# Patient Record
Sex: FEMALE | Race: WHITE | Employment: UNEMPLOYED | URBAN - METROPOLITAN AREA
[De-identification: names, ages, dates, MRNs, and addresses within clinical notes are randomized per-mention and may not be internally consistent; named-entity substitution may affect disease eponyms.]

---
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
## 2023-08-07 ENCOUNTER — APPOINTMENT (OUTPATIENT)
Dept: LAB | Facility: LAB | Age: 23
End: 2023-08-07
Payer: COMMERCIAL

## 2023-11-04 ENCOUNTER — HOSPITAL ENCOUNTER (OUTPATIENT)
Dept: RADIOLOGY | Facility: EXTERNAL LOCATION | Age: 23
Discharge: HOME | End: 2023-11-04
Payer: COMMERCIAL

## 2023-11-04 DIAGNOSIS — S93.402A SPRAIN OF LEFT ANKLE, INITIAL ENCOUNTER: ICD-10-CM

## 2023-11-08 RX ORDER — CEFUROXIME AXETIL 500 MG/1
TABLET ORAL
COMMUNITY
Start: 2023-04-07

## 2023-11-08 RX ORDER — ALBUTEROL SULFATE 90 UG/1
AEROSOL, METERED RESPIRATORY (INHALATION)
COMMUNITY

## 2023-11-08 RX ORDER — SULFACETAMIDE SODIUM 100 MG/ML
LOTION TOPICAL
COMMUNITY
Start: 2023-02-09

## 2023-11-08 RX ORDER — SPIRONOLACTONE 100 MG/1
TABLET, FILM COATED ORAL
COMMUNITY
Start: 2020-12-17

## 2023-11-08 RX ORDER — SPIRONOLACTONE 50 MG/1
TABLET, FILM COATED ORAL
COMMUNITY
Start: 2023-07-12

## 2023-11-08 RX ORDER — TAZAROTENE 0.5 MG/G
GEL TOPICAL
COMMUNITY
Start: 2019-02-18

## 2023-11-09 ENCOUNTER — OFFICE VISIT (OUTPATIENT)
Dept: ORTHOPEDIC SURGERY | Facility: CLINIC | Age: 23
End: 2023-11-09
Payer: COMMERCIAL

## 2023-11-09 DIAGNOSIS — S93.492A SPRAIN OF ANTERIOR TALOFIBULAR LIGAMENT OF LEFT ANKLE, INITIAL ENCOUNTER: Primary | ICD-10-CM

## 2023-11-09 PROCEDURE — 99203 OFFICE O/P NEW LOW 30 MIN: CPT | Performed by: ORTHOPAEDIC SURGERY

## 2023-11-09 PROCEDURE — 99213 OFFICE O/P EST LOW 20 MIN: CPT | Performed by: ORTHOPAEDIC SURGERY

## 2023-11-09 NOTE — PROGRESS NOTES
This 23-year-old woman was running on October 27, 2023 when she tripped and twisted her left ankle.  Patient went to an urgent care and was fitted for a lacer ankle brace.  She has been wearing this brace.  She was referred for definitive treatment.    Patient notes the pain in her ankle is lateral and associated with ecchymosis and bruising.  She denies any previous injuries to the ankle or any neurologic symptoms in the lower extremity.    Review of systems:  A complete review of the patient's past medical history and review of 30 systems and all medications and allergies was performed. Please see adult medical record for details.    A Family history for genetic or familial inheritance issues and Social history including smoking history, alcohol consumption and exercise activities was also reviewed and significant findings noted in the patients history of present illness.    Physical Exam:  The patient is well-nourished and well-developed and in no acute distress. The patient displayed normal mood and affect. The patient's pupils were equal, round sclerae are white. Patient's respirations appear to be regular and unlabored.    Left ankle revealed swelling, bruising and tenderness over the lateral aspect of the ankle.  Point tenderness was seen at the anterior talofibular ligament.  There was no gross instability of the ankle.  There were no other skin abnormalities and no lymphangitis.  No obvious deformity of the ankle or foot.  Ankle motion was difficult to examine subtalar, midtarsal motion appeared to be full.  The Tyson test was negative for Achilles tendon rupture. The neurological exam including motor and sensory exam was performed and noted to be intact. The vascular examination including palpation of pulses and capillary refill of the foot was performed and determined to be intact.    Imaging:  I personally reviewed and measured the radiographs including AP and lateral views of the extremity and they  revealed soft tissue swelling but no acute fracture or dislocation or other significant findings.    Impression & Plan:  It is my impression this patient has sustained a grade 2 sprain of the anterior talofibular ligament.    The patient should continue wearing her ankle brace at all times weightbearing over a sock.  She should not do any running or jumping.  I would like to see the patient back in 1 month for reexam.      Note dictated with [employer] software.  Completed without full type editing and sent to avoid delay.

## 2023-12-14 ENCOUNTER — OFFICE VISIT (OUTPATIENT)
Dept: ORTHOPEDIC SURGERY | Facility: CLINIC | Age: 23
End: 2023-12-14
Payer: COMMERCIAL

## 2023-12-14 DIAGNOSIS — S93.492S SPRAIN OF ANTERIOR TALOFIBULAR LIGAMENT OF LEFT ANKLE, SEQUELA: ICD-10-CM

## 2023-12-14 PROCEDURE — 99213 OFFICE O/P EST LOW 20 MIN: CPT | Performed by: ORTHOPAEDIC SURGERY

## 2023-12-14 NOTE — PROGRESS NOTES
This 23-year-old woman returns today approximately 6 weeks status post grade 2 sprain of the left anterior talofibular ligament.  The patient is without complaints at this time.    Review of systems:  A complete review of the patient's past medical history and review of 30 systems and all medications and allergies was performed. Please see adult medical record for details.    A Family history for genetic or familial inheritance issues and Social history including smoking history, alcohol consumption and exercise activities was also reviewed and significant findings noted in the patients history of present illness.    Physical Exam:  The patient is well-nourished and well-developed and in no acute distress. The patient displayed normal mood and affect.  Patient's respirations appear to be regular and unlabored.  Examination of the left ankle reveals no swelling and minimal tenderness at the anterior talofibular ligament.  Ankle subtalar midtarsal motion is full.  There is slight weakness of eversion.  There is no gross instability of the ankle.  The calf is soft and nontender and without swelling.  The the remainder of the neurovascular exam is intact.  Good alignment is noted.    Impression & Plan:   It is my impression is patient has a resolving ankle sprain.  She should continue wearing her Aircast stirrup until her physical therapist feels she is reach full strength.  I prescribed physical therapy for strengthening and proprioceptive exercises.  She should not return to running or jumping sports until she is reach full strength.  I would be delighted to see the patient back the future should  they  have further problems.    Note dictated with "[employer] software.  Completed without full type editing and sent to avoid delay.

## 2023-12-14 NOTE — LETTER
December 14, 2023       No Recipients    Patient: Irene Ritter   YOB: 2000   Date of Visit: 12/14/2023       Dear Dr. Hong Recipients:    Thank you for referring Irene Ritter to me for evaluation. Below are my notes for this consultation.  If you have questions, please do not hesitate to call me. I look forward to following your patient along with you.       Sincerely,     Scott Colbert MD      CC:   No Recipients  ______________________________________________________________________________________    This 23-year-old woman returns today approximately 6 weeks status post grade 2 sprain of the left anterior talofibular ligament.  The patient is without complaints at this time.    Review of systems:  A complete review of the patient's past medical history and review of 30 systems and all medications and allergies was performed. Please see adult medical record for details.    A Family history for genetic or familial inheritance issues and Social history including smoking history, alcohol consumption and exercise activities was also reviewed and significant findings noted in the patients history of present illness.    Physical Exam:  The patient is well-nourished and well-developed and in no acute distress. The patient displayed normal mood and affect.  Patient's respirations appear to be regular and unlabored.  Examination of the left ankle reveals no swelling and minimal tenderness at the anterior talofibular ligament.  Ankle subtalar midtarsal motion is full.  There is slight weakness of eversion.  There is no gross instability of the ankle.  The calf is soft and nontender and without swelling.  The the remainder of the neurovascular exam is intact.  Good alignment is noted.    Impression & Plan:   It is my impression is patient has a resolving ankle sprain.  She should continue wearing her Aircast stirrup until her physical therapist feels she is reach full strength.  I prescribed  physical therapy for strengthening and proprioceptive exercises.  She should not return to running or jumping sports until she is reach full strength.  I would be delighted to see the patient back the future should  they  have further problems.    Note dictated with "[employer] software.  Completed without full type editing and sent to avoid delay.

## 2024-01-02 ENCOUNTER — APPOINTMENT (OUTPATIENT)
Dept: PHYSICAL THERAPY | Facility: HOSPITAL | Age: 24
End: 2024-01-02
Payer: COMMERCIAL

## 2024-01-08 ENCOUNTER — EVALUATION (OUTPATIENT)
Dept: PHYSICAL THERAPY | Facility: CLINIC | Age: 24
End: 2024-01-08
Payer: COMMERCIAL

## 2024-01-08 DIAGNOSIS — M25.572 LEFT ANKLE PAIN, UNSPECIFIED CHRONICITY: Primary | ICD-10-CM

## 2024-01-08 PROCEDURE — 97110 THERAPEUTIC EXERCISES: CPT | Mod: GP

## 2024-01-08 PROCEDURE — 97161 PT EVAL LOW COMPLEX 20 MIN: CPT | Mod: GP

## 2024-01-08 ASSESSMENT — ENCOUNTER SYMPTOMS
OCCASIONAL FEELINGS OF UNSTEADINESS: 0
DEPRESSION: 0
LOSS OF SENSATION IN FEET: 0

## 2024-01-08 NOTE — PROGRESS NOTES
Physical Therapy    Physical Therapy Evaluation    Patient Name: Irene Ritter  MRN: [id number]  Today's Date: 1/8/2024  Time Calculation  Start Time: 0945  Stop Time: 1030  Time Calculation (min): 45 min      Plan  Treatment/Interventions: Cryotherapy, Education/ Instruction, Hot pack, Gait training, Manual therapy, Neuromuscular re-education, Therapeutic exercises  PT Plan: Skilled PT  PT Frequency: 1 time per week  Duration: 6-8 weeks  Onset Date: 10/27/23  Certification Period Start Date: 01/08/24  Certification Period End Date: 04/07/24  Number of Treatments Authorized: [id number]  Rehab Potential: Excellent  Plan of Care Agreement: Patient    Current Problem  1. Left ankle pain, unspecified chronicity  Referral to Physical Therapy    Follow Up In Physical Therapy          Subjective   General:  General  Reason for Referral: L ankle sprain  Referred By: Scott Colbert  Preferred Learning Style: verbal, visual, written  Precautions:  Precautions  STEADI Fall Risk Score (The score of 4 or more indicates an increased risk of falling): 0  Precautions Comment: None      Goals:  Active       PT Problem       Patient will be independent with home exercise program for home maintenance.        Start:  01/08/24    Expected End:  01/22/24            Pt will demonstrate normal ROM in L ankle compared to R and improved talocrural/subtalar motion to facilitate normal squat mechanics.        Start:  01/08/24    Expected End:  01/22/24            Pt will report 0-1/10 pain with all activity and exercise, to facilitate gradual increase in running mileage.        Start:  01/08/24    Expected End:  02/22/24            Pt will increase L SLS to 1 minute to indicate improved balance/proprioception.        Start:  01/08/24    Expected End:  02/22/24                           Subjective:    Chief complaints: L ankle sprain  Onset/Surgery Date: 10/27   Mechanism of Injury: Running, possibly inversion mechanism  Previous History: No    Pain:  "0/10      Location: L lateral ankle    Type: Discomfort    Aggravators: Sitting with feet crossed, squatting    Alleviators: Brace, icing, ibuprofen     Function:    Currently going to the gym, ran on treadmill for 20 min last week    Prior Level: 12 miles per week    Current limitations: Running normal amount, squatting, sitting in certain positions    Condition: Improving      Home Situation: No concerns     Sleep:     Disturbed: No     Preferred position(s):       Goals for Therapy: Avoid re-sprain, back to normal      Objective:      Observation: Leans onto R side during squat, decreased L ankle dorsiflexion      ROM/Flexibility R/L:    Ankle DF:           15   / 10    Ankle PF:            60   / 35    Ankle INV:    40   / 35    Ankle EV:    20   / 12      Gastroc: 15 / 10    Soleus:   15 / 12     Girth Measurements/Swelling :    Figure 8 R / L : 50 / 49.5     Strength R / L :    Hip Flexion:     5 / 5    Hip Extension:     5 / 5     Hip Abduction:    5 / 5    Hip Adduction:    5 / 5    Knee Extension:   5 / 5    Knee Flexion:       5 / 5    Ankle DF:             5 / 5    Ankle PF:              5 / 5    Ankle INV:      5 / 5    Ankle EV:      5 / 5     Neurological: Pt reports intact/symmetrical BLE sensation.      Gait: increased pronation L      Balance: SLS R 18 / L 11     Special Tests R / L :     Anterior Drawer:    - / -    Talar Tilt:                - / -     Squeeze test:         - / -     Tyson test:     - / -       Outcome Measure:    LEFS : 77 / 80    Treatment:  Manual therapy: DF mobs, subtalar mobs  Therapeutic Exercise   Seated arch lift 5\" x 10   Standing resisted inv / ev x 20 ea (green)   Standing eccentric calf raises in green band x 10 ea way   Tandem stance on airex 30\" x 2   SLS x 30\"   DF mob on bench 5\" x 10   Gastroc stretch at wall x 30\"    Assessment: Patient presents with chief complaint of L ankle pain following likely inversion ankle sprain while running . Referred to PT by Dr." Filemon .   Patient presents with the following impairments: decreased L ankle ROM, decreased L talocrural and subtalar mobility, decreased L ankle strength, L gastroc/soleus tightness, altered gait and squat mechanics, and pain .   Due to the above impairments, pt is having difficulty with squatting, sitting with legs crossed, and running her normal amount .     Patient taken through Gentle manual for ankle mobility, followed by foot intrinsic and ankle strengthening and balance/proprioception. Mild pain with DF mob, but fine with decreased range .   Patient would benefit from skilled PT services to address the above impairments and improve functional level.

## 2024-01-15 ENCOUNTER — APPOINTMENT (OUTPATIENT)
Dept: PHYSICAL THERAPY | Facility: CLINIC | Age: 24
End: 2024-01-15
Payer: COMMERCIAL

## 2024-01-22 ENCOUNTER — APPOINTMENT (OUTPATIENT)
Dept: PHYSICAL THERAPY | Facility: CLINIC | Age: 24
End: 2024-01-22
Payer: COMMERCIAL

## 2024-01-29 ENCOUNTER — APPOINTMENT (OUTPATIENT)
Dept: PHYSICAL THERAPY | Facility: CLINIC | Age: 24
End: 2024-01-29
Payer: COMMERCIAL

## 2024-02-05 ENCOUNTER — TREATMENT (OUTPATIENT)
Dept: PHYSICAL THERAPY | Facility: CLINIC | Age: 24
End: 2024-02-05
Payer: COMMERCIAL

## 2024-02-05 DIAGNOSIS — M25.572 LEFT ANKLE PAIN, UNSPECIFIED CHRONICITY: Primary | ICD-10-CM

## 2024-02-05 PROCEDURE — 97110 THERAPEUTIC EXERCISES: CPT | Mod: GP

## 2024-02-05 NOTE — PROGRESS NOTES
"Physical Therapy    Physical Therapy Treatment    Patient Name: Irene Ritter  MRN: [id number]  Today's Date: 2/5/2024  Time Calculation  Start Time: 1124  Stop Time: 1203  Time Calculation (min): 39 min  Visit Count: 2    Assessment:   Demo's some decreased L calf strength still as well as decreased balance/proprioception.     Plan:   Split squat w/calf raise, SLS on BOSU    Current Problem  1. Left ankle pain, unspecified chronicity            Subjective    No pain really other than if she sits a certain way. Ran 17 miles total last week, no issues.   Precautions   None  Pain   0/10    Objective   L ankle ROM WNL  No TTP around L ankle  Normal gait  Leans toward RLE during DL squat before cues    Treatments:  Therapeutic Exercise   DF mob on bench 5\" x 10   SL calf raises in theraband vector 2 x 10 ea way R/L, stick for support   TRX DL squat w/arch lift 2 x 10, YTB around mid foot   TRX reverse slider lunge 2 x 10 R/L   Elevated split squat w/YKB reach 2 x 10 R/L   Teeterboard 2 x 10 AP, L leg only              SL RDL to BOSU for balance 2 x 10 R/L   Ccamb lateral over hurdles 3 plates x 4 leading with L, 2.5 plates leading with R                Goals:  Active       PT Problem       Patient will be independent with home exercise program for home maintenance.        Start:  01/08/24    Expected End:  01/22/24            Pt will demonstrate normal ROM in L ankle compared to R and improved talocrural/subtalar motion to facilitate normal squat mechanics.        Start:  01/08/24    Expected End:  01/22/24            Pt will report 0-1/10 pain with all activity and exercise, to facilitate gradual increase in running mileage.        Start:  01/08/24    Expected End:  02/22/24            Pt will increase L SLS to 1 minute to indicate improved balance/proprioception.        Start:  01/08/24    Expected End:  02/22/24              "

## 2024-02-26 ENCOUNTER — TREATMENT (OUTPATIENT)
Dept: PHYSICAL THERAPY | Facility: CLINIC | Age: 24
End: 2024-02-26
Payer: COMMERCIAL

## 2024-02-26 DIAGNOSIS — M25.572 LEFT ANKLE PAIN, UNSPECIFIED CHRONICITY: ICD-10-CM

## 2024-02-26 PROCEDURE — 97110 THERAPEUTIC EXERCISES: CPT | Mod: GP

## 2024-02-26 NOTE — PROGRESS NOTES
"Physical Therapy    Physical Therapy Treatment    Patient Name: Irene Ritter  MRN: [id number]  Today's Date: 2/26/2024  Time Calculation  Start Time: 1112  Stop Time: 1157  Time Calculation (min): 45 min  PT Therapeutic Procedures Time Entry  Therapeutic Exercise Time Entry: 45  Visit Count: 3    Assessment:   Displays persistent balance/proprioception deficit and as well as decreased calf strength on the L side. Challenged by new balance exercises but able to complete without aggravating symptoms. Plan on dc [place] if no new symptoms arise.     Plan:   Recheck and DC next visit    Current Problem  1. Left ankle pain, unspecified chronicity  Follow Up In Physical Therapy            Subjective    Continues to have no pain, running around 15 miles a week   Precautions   None  Pain   0/10    Objective   L ankle pronation during SL exercises, able to correct     Treatments:  Therapeutic Exercise   DF mob on bench 5\" x 10   SL calf raises in theraband vector 2 x 10 ea way R/L, stick for support   TRX DL squat w/arch lift 2 x 10, YTB around mid foot   TRX reverse slider lunge 2 x 10 R/L   Elevated split squat w/calf raises on last rep 2 x 10 R/L   Teeterboard 2 x 10 AP, L leg only              SL RDL to BOSU for balance 2 x 10 R/L   Ccamb lateral over hurdles 3 plates x 4 leading with L/R   SL Bosu balance L 5 trials around 5 seconds each   BAPS, L2 lateral taps 2x10 L    L standing on foam front/side/back taps 2x10         Goals:  Active       PT Problem       Patient will be independent with home exercise program for home maintenance.  (Met)       Start:  01/08/24    Expected End:  01/22/24    Resolved:  02/05/24         Pt will demonstrate normal ROM in L ankle compared to R and improved talocrural/subtalar motion to facilitate normal squat mechanics.  (Met)       Start:  01/08/24    Expected End:  01/22/24    Resolved:  02/05/24         Pt will report 0-1/10 pain with all activity and exercise, to facilitate gradual " increase in running mileage.        Start:  01/08/24    Expected End:  02/22/24            Pt will increase L SLS to 1 minute to indicate improved balance/proprioception.        Start:  01/08/24    Expected End:  02/22/24

## 2024-03-18 ENCOUNTER — TREATMENT (OUTPATIENT)
Dept: PHYSICAL THERAPY | Facility: CLINIC | Age: 24
End: 2024-03-18
Payer: COMMERCIAL

## 2024-03-18 DIAGNOSIS — M25.572 LEFT ANKLE PAIN, UNSPECIFIED CHRONICITY: ICD-10-CM

## 2024-03-18 PROCEDURE — 97110 THERAPEUTIC EXERCISES: CPT | Mod: GP

## 2024-03-18 NOTE — PROGRESS NOTES
"Physical Therapy    Physical Therapy Treatment    Patient Name: Irene Ritter  MRN: [id number]  Today's Date: 3/18/2024  Time Calculation  Start Time: 1000  Stop Time: 1030  Time Calculation (min): 30 min       PT Therapeutic Procedures Time Entry  Therapeutic Exercise Time Entry: 30       Visit Count: 4    Assessment:   Pt is back to normal L ankle ROM, strength, and her balance/proprioception is much improved. She has had no issues increasing her mileage, and we went over importance of continuing to strength/cross train as she increases mileage further. Pt happy with progress up to this point and is in agreement with plan for discharge.     Plan:   Discharge    Current Problem  1. Left ankle pain, unspecified chronicity  Follow Up In Physical Therapy            Subjective    Mileage up to 18/week, longest run has been about a 10k. No pain or issues.   Precautions   None  Pain   0/10    Objective   ROM/Flexibility R/L:                          Ankle DF:           15   / 15                          Ankle PF:            60   / 55                          Ankle INV:           40   / 40                          Ankle EV:            20   / 20                             Gastroc:           15 / 15                          Soleus:              15 / 20                 Strength R / L :                          Ankle DF:             5 / 5                          Ankle PF:              5 / 5                          Ankle INV:             5 / 5                          Ankle EV:              5 / 5                 Neurological: Pt reports intact/symmetrical BLE sensation.                  Gait: increased pronation L                  Balance: SLS R 45 / L 60                 Outcome Measure:                          LEFS : 80 / 80    Treatments:  Therapeutic Exercise   Recheck   SL calf raises x 20 R/L   DF mob on bench 5\" x 10   Elevated split squat w/calf raises on last rep 2 x 10 R/L   SLS on BOSU x 20\" R/L ea ball side and " flat side   SL AP on BOSU x 10 R/L   SL RDL to BOSU w/red KB x 10 R/L   Curtsy lunge to march x 10 R/L        Goals:  Resolved       PT Problem       Patient will be independent with home exercise program for home maintenance.  (Met)       Start:  24    Expected End:  24    Resolved:  24         Pt will demonstrate normal ROM in L ankle compared to R and improved talocrural/subtalar motion to facilitate normal squat mechanics.  (Met)       Start:  24    Expected End:  24    Resolved:  24         Pt will report 0-1/10 pain with all activity and exercise, to facilitate gradual increase in running mileage.  (Met)       Start:  24    Expected End:  24    Resolved:  24         Pt will increase L SLS to 1 minute to indicate improved balance/proprioception.  (Met)       Start:  24    Expected End:  24    Resolved:  24           Physical Therapy    Discharge Summary    Name: Irene Ritter  MRN: [id number]  : 2000  Date: 24    Discharge Summary: PT    Discharge Information: Date of discharge 3/18/24, Date of last visit 3/18/24, Date of evaluation 24, Number of attended visits 4, Referred by Filemon, and Referred for L ankle sprain    Therapy Summary: Pt went through mobility exercises, strength training, and a lot of balance.proprioception work.     Discharge Status: Independent with HEP, met all goals, increasing mileage without issues.     Rehab Discharge Reason: Achieved all and/or the most significant goals(s)